# Patient Record
Sex: FEMALE | URBAN - METROPOLITAN AREA
[De-identification: names, ages, dates, MRNs, and addresses within clinical notes are randomized per-mention and may not be internally consistent; named-entity substitution may affect disease eponyms.]

---
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
## 2023-08-04 DIAGNOSIS — Z00.00 HEALTHCARE MAINTENANCE: ICD-10-CM

## 2023-08-09 ENCOUNTER — LAB (OUTPATIENT)
Dept: LAB | Facility: LAB | Age: 23
End: 2023-08-09
Payer: COMMERCIAL

## 2023-08-09 DIAGNOSIS — Z00.00 HEALTHCARE MAINTENANCE: ICD-10-CM

## 2023-08-09 LAB
ALANINE AMINOTRANSFERASE (SGPT) (U/L) IN SER/PLAS: 10 U/L (ref 7–45)
ALBUMIN (G/DL) IN SER/PLAS: 4.8 G/DL (ref 3.4–5)
ALKALINE PHOSPHATASE (U/L) IN SER/PLAS: 47 U/L (ref 33–110)
ANION GAP IN SER/PLAS: 10 MMOL/L (ref 10–20)
ASPARTATE AMINOTRANSFERASE (SGOT) (U/L) IN SER/PLAS: 11 U/L (ref 9–39)
BASOPHILS (10*3/UL) IN BLOOD BY AUTOMATED COUNT: 0.04 X10E9/L (ref 0–0.1)
BASOPHILS/100 LEUKOCYTES IN BLOOD BY AUTOMATED COUNT: 0.9 % (ref 0–2)
BILIRUBIN TOTAL (MG/DL) IN SER/PLAS: 0.4 MG/DL (ref 0–1.2)
CALCIUM (MG/DL) IN SER/PLAS: 9.3 MG/DL (ref 8.6–10.3)
CARBON DIOXIDE, TOTAL (MMOL/L) IN SER/PLAS: 27 MMOL/L (ref 21–32)
CHLORIDE (MMOL/L) IN SER/PLAS: 106 MMOL/L (ref 98–107)
CHOLESTEROL (MG/DL) IN SER/PLAS: 123 MG/DL (ref 0–199)
CHOLESTEROL IN HDL (MG/DL) IN SER/PLAS: 53.6 MG/DL
CHOLESTEROL/HDL RATIO: 2.3
CLUE CELLS: NORMAL
CREATININE (MG/DL) IN SER/PLAS: 0.72 MG/DL (ref 0.5–1.05)
EOSINOPHILS (10*3/UL) IN BLOOD BY AUTOMATED COUNT: 0.34 X10E9/L (ref 0–0.7)
EOSINOPHILS/100 LEUKOCYTES IN BLOOD BY AUTOMATED COUNT: 7.8 % (ref 0–6)
ERYTHROCYTE DISTRIBUTION WIDTH (RATIO) BY AUTOMATED COUNT: 12.6 % (ref 11.5–14.5)
ERYTHROCYTE MEAN CORPUSCULAR HEMOGLOBIN CONCENTRATION (G/DL) BY AUTOMATED: 33 G/DL (ref 32–36)
ERYTHROCYTE MEAN CORPUSCULAR VOLUME (FL) BY AUTOMATED COUNT: 89 FL (ref 80–100)
ERYTHROCYTES (10*6/UL) IN BLOOD BY AUTOMATED COUNT: 5.09 X10E12/L (ref 4–5.2)
ESTIMATED AVERAGE GLUCOSE FOR HBA1C: 105 MG/DL
GFR FEMALE: >90 ML/MIN/1.73M2
GLUCOSE (MG/DL) IN SER/PLAS: 89 MG/DL (ref 74–99)
HEMATOCRIT (%) IN BLOOD BY AUTOMATED COUNT: 45.2 % (ref 36–46)
HEMOGLOBIN (G/DL) IN BLOOD: 14.9 G/DL (ref 12–16)
HEMOGLOBIN A1C/HEMOGLOBIN TOTAL IN BLOOD: 5.3 %
IMMATURE GRANULOCYTES/100 LEUKOCYTES IN BLOOD BY AUTOMATED COUNT: 0 % (ref 0–0.9)
LDL: 61 MG/DL (ref 0–119)
LEUKOCYTES (10*3/UL) IN BLOOD BY AUTOMATED COUNT: 4.4 X10E9/L (ref 4.4–11.3)
LYMPHOCYTES (10*3/UL) IN BLOOD BY AUTOMATED COUNT: 1.83 X10E9/L (ref 1.2–4.8)
LYMPHOCYTES/100 LEUKOCYTES IN BLOOD BY AUTOMATED COUNT: 41.9 % (ref 13–44)
MONOCYTES (10*3/UL) IN BLOOD BY AUTOMATED COUNT: 0.36 X10E9/L (ref 0.1–1)
MONOCYTES/100 LEUKOCYTES IN BLOOD BY AUTOMATED COUNT: 8.2 % (ref 2–10)
NEUTROPHILS (10*3/UL) IN BLOOD BY AUTOMATED COUNT: 1.8 X10E9/L (ref 1.2–7.7)
NEUTROPHILS/100 LEUKOCYTES IN BLOOD BY AUTOMATED COUNT: 41.2 % (ref 40–80)
NON HDL CHOLESTEROL: 69 MG/DL (ref 0–149)
NUGENT SCORE: 1
PLATELETS (10*3/UL) IN BLOOD AUTOMATED COUNT: 251 X10E9/L (ref 150–450)
POTASSIUM (MMOL/L) IN SER/PLAS: 4.6 MMOL/L (ref 3.5–5.3)
PROTEIN TOTAL: 6.7 G/DL (ref 6.4–8.2)
SODIUM (MMOL/L) IN SER/PLAS: 138 MMOL/L (ref 136–145)
THYROTROPIN (MIU/L) IN SER/PLAS BY DETECTION LIMIT <= 0.05 MIU/L: 1.36 MIU/L (ref 0.44–3.98)
TRIGLYCERIDE (MG/DL) IN SER/PLAS: 43 MG/DL (ref 0–149)
UREA NITROGEN (MG/DL) IN SER/PLAS: 13 MG/DL (ref 6–23)
VLDL: 9 MG/DL (ref 0–40)
YEAST: NORMAL

## 2023-08-09 PROCEDURE — 85025 COMPLETE CBC W/AUTO DIFF WBC: CPT

## 2023-08-09 PROCEDURE — 84443 ASSAY THYROID STIM HORMONE: CPT

## 2023-08-09 PROCEDURE — 36415 COLL VENOUS BLD VENIPUNCTURE: CPT

## 2023-08-09 PROCEDURE — 80061 LIPID PANEL: CPT

## 2023-08-09 PROCEDURE — 80053 COMPREHEN METABOLIC PANEL: CPT

## 2023-08-09 PROCEDURE — 83036 HEMOGLOBIN GLYCOSYLATED A1C: CPT

## 2023-08-10 NOTE — RESULT ENCOUNTER NOTE
Hemoglobin A1c well within normal limits at 5.3%    Thyroid within normal limits    Cholesterol looks fantastic at 123, HDL 53, LDL 61, triglycerides 43    Sugar, kidneys, liver, electrodes are all within normal limits    Complete blood cell count shows no anemia    This is fantastic lab work    Will discuss this further at your health maintenance examination on 8/16/2023

## 2023-08-14 ASSESSMENT — PROMIS GLOBAL HEALTH SCALE
RATE_PHYSICAL_HEALTH: GOOD
RATE_AVERAGE_FATIGUE: MILD
RATE_MENTAL_HEALTH: FAIR
EMOTIONAL_PROBLEMS: SOMETIMES
RATE_QUALITY_OF_LIFE: GOOD
RATE_SOCIAL_SATISFACTION: FAIR
RATE_AVERAGE_PAIN: 4
CARRYOUT_PHYSICAL_ACTIVITIES: COMPLETELY
RATE_GENERAL_HEALTH: VERY GOOD
CARRYOUT_SOCIAL_ACTIVITIES: GOOD

## 2023-08-16 ENCOUNTER — OFFICE VISIT (OUTPATIENT)
Dept: PRIMARY CARE | Facility: CLINIC | Age: 23
End: 2023-08-16
Payer: COMMERCIAL

## 2023-08-16 ENCOUNTER — LAB (OUTPATIENT)
Dept: LAB | Facility: LAB | Age: 23
End: 2023-08-16
Payer: COMMERCIAL

## 2023-08-16 VITALS
HEART RATE: 58 BPM | BODY MASS INDEX: 20.17 KG/M2 | DIASTOLIC BLOOD PRESSURE: 62 MMHG | SYSTOLIC BLOOD PRESSURE: 100 MMHG | HEIGHT: 69 IN | WEIGHT: 136.2 LBS

## 2023-08-16 DIAGNOSIS — E53.8 B12 DEFICIENCY: ICD-10-CM

## 2023-08-16 DIAGNOSIS — E55.9 VITAMIN D DEFICIENCY: ICD-10-CM

## 2023-08-16 DIAGNOSIS — Z00.00 HEALTHCARE MAINTENANCE: ICD-10-CM

## 2023-08-16 DIAGNOSIS — Z00.00 HEALTHCARE MAINTENANCE: Primary | ICD-10-CM

## 2023-08-16 DIAGNOSIS — F32.A DEPRESSION, UNSPECIFIED DEPRESSION TYPE: ICD-10-CM

## 2023-08-16 PROBLEM — N92.6 IRREGULAR MENSES: Status: ACTIVE | Noted: 2023-08-16

## 2023-08-16 LAB
POC APPEARANCE, URINE: CLEAR
POC BILIRUBIN, URINE: NEGATIVE
POC BLOOD, URINE: NEGATIVE
POC COLOR, URINE: YELLOW
POC GLUCOSE, URINE: NEGATIVE MG/DL
POC KETONES, URINE: NEGATIVE MG/DL
POC LEUKOCYTES, URINE: NEGATIVE
POC NITRITE,URINE: NEGATIVE
POC PH, URINE: 7.5 PH
POC PROTEIN, URINE: NEGATIVE MG/DL
POC SPECIFIC GRAVITY, URINE: <=1.005
POC UROBILINOGEN, URINE: 0.2 EU/DL

## 2023-08-16 PROCEDURE — 36415 COLL VENOUS BLD VENIPUNCTURE: CPT

## 2023-08-16 PROCEDURE — 99213 OFFICE O/P EST LOW 20 MIN: CPT | Performed by: STUDENT IN AN ORGANIZED HEALTH CARE EDUCATION/TRAINING PROGRAM

## 2023-08-16 PROCEDURE — 1036F TOBACCO NON-USER: CPT | Performed by: STUDENT IN AN ORGANIZED HEALTH CARE EDUCATION/TRAINING PROGRAM

## 2023-08-16 PROCEDURE — 99395 PREV VISIT EST AGE 18-39: CPT | Performed by: STUDENT IN AN ORGANIZED HEALTH CARE EDUCATION/TRAINING PROGRAM

## 2023-08-16 PROCEDURE — 82306 VITAMIN D 25 HYDROXY: CPT

## 2023-08-16 PROCEDURE — 82607 VITAMIN B-12: CPT

## 2023-08-16 PROCEDURE — 81002 URINALYSIS NONAUTO W/O SCOPE: CPT | Performed by: STUDENT IN AN ORGANIZED HEALTH CARE EDUCATION/TRAINING PROGRAM

## 2023-08-16 RX ORDER — FLUOXETINE HYDROCHLORIDE 20 MG/1
1 CAPSULE ORAL DAILY
COMMUNITY
Start: 2021-03-10 | End: 2023-08-16 | Stop reason: SDUPTHER

## 2023-08-16 RX ORDER — FLUOXETINE HYDROCHLORIDE 40 MG/1
40 CAPSULE ORAL DAILY
Qty: 90 CAPSULE | Refills: 1 | Status: SHIPPED | OUTPATIENT
Start: 2023-08-16

## 2023-08-16 NOTE — PROGRESS NOTES
Subjective   Patient ID: Karie Hardy is a 23 y.o. female who presents for Annual Exam.  Today she is accompanied by alone.     HPI  1. Health Maintenance Examination  Overall patient is doing well.   Immunization: UPD on all immunizations  Last Tdap 11/5/2020  COVID-19 vaccine Pfizer x1, Moderna x1  OB/GYN: Sees Chelle German, last visit on (8/8/2023).   Diet: Well balanced  Exercise: Active  Tobacco: Denies use  EtOH: Rarely Socially    Most recent HbA1c, CBC, CMP, Lipid Panel (8/9/2023) all within normal limits     2. Fatigue/vitamin D deficiency/vitamin B12 deficiency  Recent TSH on 8/9/2023 was 1.36.   Denies any nausea, vomiting, myalgia or constipation.  Has a history of vitamin D and B12 deficiency  Unfortunate was not added on her lab work and requesting for to be done     3.  Depression  Currently on fluoxetine 40 mg daily  Stated that she is working with her therapist who recommended that we increase her 20 mg to 40  Doing much better with this medication  Denies any suicidal ideation  Requesting refills      Current Outpatient Medications on File Prior to Visit   Medication Sig Dispense Refill    [DISCONTINUED] FLUoxetine (PROzac) 20 mg capsule Take 1 capsule (20 mg) by mouth once daily.       No current facility-administered medications on file prior to visit.        Allergies   Allergen Reactions    Aripiprazole Anxiety, Other, Itching and Shortness of breath     PANIC ATTACK    Quetiapine Anaphylaxis, Anxiety and Other    Hydrocodone-Acetaminophen Other and Nausea And Vomiting    Cocoa Diarrhea and Other       Immunization History   Administered Date(s) Administered    Flu vaccine (IIV4), preservative free *Check age/dose* 10/08/2020, 10/06/2021    Moderna SARS-CoV-2 Vaccination 05/04/2021    Pfizer Purple Cap SARS-CoV-2 12/19/2021    Tdap vaccine, age 10 years and older (BOOSTRIX) 10/08/2020         Review of Systems  All pertinent positive symptoms are included in the history of present  "illness.  All other systems have been reviewed and are negative and noncontributory to this patient's current ailments.     Objective   /62 (BP Location: Left arm, Patient Position: Sitting, BP Cuff Size: Adult)   Pulse 58   Ht 1.753 m (5' 9\")   Wt 61.8 kg (136 lb 3.2 oz)   BMI 20.11 kg/m²   BSA: 1.73 meters squared  Office Visit on 08/16/2023   Component Date Value Ref Range Status    POC Color, Urine 08/16/2023 Yellow  Straw, Yellow, Light Yellow Final    POC Appearance, Urine 08/16/2023 Clear  Clear Final    POC Specific Gravity, Urine 08/16/2023 <=1.005  1.005 - 1.035 Final    POC PH, Urine 08/16/2023 7.5  No Reference Range Established PH Final    POC Protein, Urine 08/16/2023 NEGATIVE  NEGATIVE, 30 (1+) mg/dl Final    POC Glucose, Urine 08/16/2023 NEGATIVE  NEGATIVE mg/dl Final    POC Blood, Urine 08/16/2023 NEGATIVE  NEGATIVE Final    POC Ketones, Urine 08/16/2023 NEGATIVE  NEGATIVE mg/dl Final    POC Bilirubin, Urine 08/16/2023 NEGATIVE  NEGATIVE Final    POC Urobilinogen, Urine 08/16/2023 0.2  0.2, 1.0 EU/DL Final    Poc Nitrate, Urine 08/16/2023 NEGATIVE  NEGATIVE Final    POC Leukocytes, Urine 08/16/2023 NEGATIVE  NEGATIVE Final   Lab on 08/09/2023   Component Date Value Ref Range Status    TSH 08/09/2023 1.36  0.44 - 3.98 mIU/L Final     TSH testing is performed using different testing    methodology at [facility] than at other    [facility]. Direct result comparisons should    only be made within the same method.    Cholesterol 08/09/2023 123  0 - 199 mg/dL Final    .      AGE      DESIRABLE   BORDERLINE HIGH   HIGH     0-19 Y     0 - 169       170 - 199     >/= 200    20-24 Y     0 - 189       190 - 224     >/= 225         >24 Y     0 - 199       200 - 239     >/= 240   **All ranges are based on fasting samples. Specific   therapeutic targets will vary based on patient-specific   cardiac risk.  .   Pediatric guidelines reference:Pediatrics 2011, 128(S5).   Adult guidelines " reference: NCEP ATPIII Guidelines,     LUZ 2001, 258:2486-97  .   Venipuncture immediately after or during the    administration of Metamizole may lead to falsely   low results. Testing should be performed immediately   prior to Metamizole dosing.    HDL 08/09/2023 53.6  mg/dL Final    .      AGE      VERY LOW   LOW     NORMAL    HIGH       0-19 Y       < 35   < 40     40-45     ----    20-24 Y       ----   < 40       >45     ----      >24 Y       ----   < 40     40-60      >60  .    Cholesterol/HDL Ratio 08/09/2023 2.3   Final    REF VALUES  DESIRABLE  < 3.4  HIGH RISK  > 5.0    LDL 08/09/2023 61  0 - 119 mg/dL Final    .                           NEAR      BORD      AGE      DESIRABLE  OPTIMAL    HIGH     HIGH     VERY HIGH     0-19 Y     0 - 109     ---    110-129   >/= 130     ----    20-24 Y     0 - 119     ---    120-159   >/= 160     ----      >24 Y     0 -  99   100-129  130-159   160-189     >/=190  .    VLDL 08/09/2023 9  0 - 40 mg/dL Final    Triglycerides 08/09/2023 43  0 - 149 mg/dL Final    .      AGE      DESIRABLE   BORDERLINE HIGH   HIGH     VERY HIGH   0 D-90 D    19 - 174         ----         ----        ----  91 D- 9 Y     0 -  74        75 -  99     >/= 100      ----    10-19 Y     0 -  89        90 - 129     >/= 130      ----    20-24 Y     0 - 114       115 - 149     >/= 150      ----         >24 Y     0 - 149       150 - 199    200- 499    >/= 500  .   Venipuncture immediately after or during the    administration of Metamizole may lead to falsely   low results. Testing should be performed immediately   prior to Metamizole dosing.    Non HDL Cholesterol 08/09/2023 69  0 - 149 mg/dL Final        AGE      DESIRABLE   BORDERLINE HIGH   HIGH     VERY HIGH     0-19 Y     0 - 119       120 - 144     >/= 145    >/= 160    20-24 Y     0 - 149       150 - 189     >/= 190      ----         >24 Y    30 MG/DL ABOVE LDL CHOLESTEROL GOAL  .    Glucose 08/09/2023 89  74 - 99 mg/dL Final    Sodium  08/09/2023 138  136 - 145 mmol/L Final    Potassium 08/09/2023 4.6  3.5 - 5.3 mmol/L Final    Chloride 08/09/2023 106  98 - 107 mmol/L Final    Bicarbonate 08/09/2023 27  21 - 32 mmol/L Final    Anion Gap 08/09/2023 10  10 - 20 mmol/L Final    Urea Nitrogen 08/09/2023 13  6 - 23 mg/dL Final    Creatinine 08/09/2023 0.72  0.50 - 1.05 mg/dL Final    GFR Female 08/09/2023 >90  >90 mL/min/1.73m2 Final     CALCULATIONS OF ESTIMATED GFR ARE PERFORMED   USING THE 2021 CKD-EPI STUDY REFIT EQUATION   WITHOUT THE RACE VARIABLE FOR THE IDMS-TRACEABLE   CREATININE METHODS.    https://jasn.asnjournals.org/content/early/2021/09/22/ASN.2002124566    Calcium 08/09/2023 9.3  8.6 - 10.3 mg/dL Final    Albumin 08/09/2023 4.8  3.4 - 5.0 g/dL Final    Alkaline Phosphatase 08/09/2023 47  33 - 110 U/L Final    Total Protein 08/09/2023 6.7  6.4 - 8.2 g/dL Final    AST 08/09/2023 11  9 - 39 U/L Final    Total Bilirubin 08/09/2023 0.4  0.0 - 1.2 mg/dL Final    ALT (SGPT) 08/09/2023 10  7 - 45 U/L Final     Patients treated with Sulfasalazine may generate    falsely decreased results for ALT.    WBC 08/09/2023 4.4  4.4 - 11.3 x10E9/L Final    RBC 08/09/2023 5.09  4.00 - 5.20 x10E12/L Final    Hemoglobin 08/09/2023 14.9  12.0 - 16.0 g/dL Final    Hematocrit 08/09/2023 45.2  36.0 - 46.0 % Final    MCV 08/09/2023 89  80 - 100 fL Final    MCHC 08/09/2023 33.0  32.0 - 36.0 g/dL Final    Platelets 08/09/2023 251  150 - 450 x10E9/L Final    RDW 08/09/2023 12.6  11.5 - 14.5 % Final    Neutrophils % 08/09/2023 41.2  40.0 - 80.0 % Final    Immature Granulocytes %, Automated 08/09/2023 0.0  0.0 - 0.9 % Final     Immature Granulocyte Count (IG) includes promyelocytes,    myelocytes and metamyelocytes but does not include bands.   Percent differential counts (%) should be interpreted in the   context of the absolute cell counts (cells/L).    Lymphocytes % 08/09/2023 41.9  13.0 - 44.0 % Final    Monocytes % 08/09/2023 8.2  2.0 - 10.0 % Final    Eosinophils  % 08/09/2023 7.8  0.0 - 6.0 % Final    Basophils % 08/09/2023 0.9  0.0 - 2.0 % Final    Neutrophils Absolute 08/09/2023 1.80  1.20 - 7.70 x10E9/L Final    Lymphocytes Absolute 08/09/2023 1.83  1.20 - 4.80 x10E9/L Final    Monocytes Absolute 08/09/2023 0.36  0.10 - 1.00 x10E9/L Final    Eosinophils Absolute 08/09/2023 0.34  0.00 - 0.70 x10E9/L Final    Basophils Absolute 08/09/2023 0.04  0.00 - 0.10 x10E9/L Final    Hemoglobin A1C 08/09/2023 5.3  % Final         Diagnosis of Diabetes-Adults   Non-Diabetic: < or = 5.6%   Increased risk for developing diabetes: 5.7-6.4%   Diagnostic of diabetes: > or = 6.5%  .       Monitoring of Diabetes                Age (y)     Therapeutic Goal (%)   Adults:          >18           <7.0   Pediatrics:    13-18           <7.5                   7-12           <8.0                   0- 6            7.5-8.5   American Diabetes Association. Diabetes Care 33(S1), Jan 2010.    Estimated Average Glucose 08/09/2023 105  MG/DL Final   Orders Only on 08/09/2023   Component Date Value Ref Range Status    Jayme Score 08/08/2023 1   Final    Comment: Interpretation of the Jayme Score  0-3.....Normal vaginal microbiota  4-6.....Intermediate results  7-10....Bacterial vaginosis      Yeast 08/08/2023 ABSENT   Final    Clue Cells 08/08/2023 ABSENT   Final       Physical Exam  CONSTITUTIONAL - well nourished, well developed, looks like stated age, in no acute distress, not ill-appearing, and not tired appearing  SKIN - normal skin color and pigmentation, normal skin turgor without rash, lesions, or nodules visualized  HEAD - no trauma, normocephalic  EYES - pupils are equal and reactive to light, extraocular muscles are intact, and normal external exam  ENT - TM's intact, no injection, no signs of infection, uvula midline, normal tongue movement and throat normal, no exudate  NECK - supple without rigidity, no neck mass was observed  CHEST - clear to auscultation, no wheezing, no crackles and no  rales, good effort  CARDIAC - regular rate and regular rhythm, no skipped beats, no murmur  ABDOMEN - no organomegaly, soft, nontender, nondistended, normal bowel sounds, no guarding/rebound/rigidity  EXTREMITIES - no edema, no deformities  NEUROLOGICAL - normal gait, normal balance, normal motor, no ataxia, alert, oriented and no focal signs  PSYCHIATRIC - alert, pleasant and cordial, age-appropriate  IMMUNOLOGIC - no cervical lymphadenopathy     Assessment/Plan   1. Health Maintenance Examination  Complete history and physical examination was performed  We discussed all of your lab work in great detail  Continue eating a well-balanced diet and exercising regularly  Please follow-up with your OB/GYN for her well woman needs    2. Fatigue/vitamin B12/D deficiency  Recent TSH on 8/9/2023 was 1.36.   I have ordered B12 and vitamin D to be done after today's appointment  We will notify the results and make recommendations accordingly     3.  Depression  Continue fluoxetine at 40 mg daily  I increased your medication to match what you are taking  Please notify the office if you have any worsening or acute signs/symptoms    Please follow-up in 6 months for continued care and refills

## 2023-08-17 LAB
CALCIDIOL (25 OH VITAMIN D3) (NG/ML) IN SER/PLAS: 30 NG/ML
COBALAMIN (VITAMIN B12) (PG/ML) IN SER/PLAS: 386 PG/ML (ref 211–911)

## 2023-08-17 NOTE — RESULT ENCOUNTER NOTE
Vitamin D within normal limits at 30 but this is the cutoff of normal so I would recommend continuing supplementation    B12 well within normal limits at 386

## 2024-01-15 ENCOUNTER — OFFICE VISIT (OUTPATIENT)
Dept: OBSTETRICS AND GYNECOLOGY | Facility: CLINIC | Age: 24
End: 2024-01-15
Payer: COMMERCIAL

## 2024-01-15 VITALS
BODY MASS INDEX: 19.4 KG/M2 | SYSTOLIC BLOOD PRESSURE: 100 MMHG | HEIGHT: 69 IN | WEIGHT: 131 LBS | DIASTOLIC BLOOD PRESSURE: 66 MMHG

## 2024-01-15 DIAGNOSIS — Z01.419 WOMEN'S ANNUAL ROUTINE GYNECOLOGICAL EXAMINATION: Primary | ICD-10-CM

## 2024-01-15 DIAGNOSIS — Z12.4 PAP SMEAR FOR CERVICAL CANCER SCREENING: ICD-10-CM

## 2024-01-15 PROCEDURE — 1036F TOBACCO NON-USER: CPT | Performed by: NURSE PRACTITIONER

## 2024-01-15 PROCEDURE — 88175 CYTOPATH C/V AUTO FLUID REDO: CPT

## 2024-01-15 PROCEDURE — 99395 PREV VISIT EST AGE 18-39: CPT | Performed by: NURSE PRACTITIONER

## 2024-01-15 ASSESSMENT — ENCOUNTER SYMPTOMS
GASTROINTESTINAL NEGATIVE: 1
NEUROLOGICAL NEGATIVE: 1
RESPIRATORY NEGATIVE: 1
EYES NEGATIVE: 1
ENDOCRINE NEGATIVE: 1
CARDIOVASCULAR NEGATIVE: 1
MUSCULOSKELETAL NEGATIVE: 1
PSYCHIATRIC NEGATIVE: 1
CONSTITUTIONAL NEGATIVE: 1

## 2024-01-15 NOTE — PROGRESS NOTES
Subjective   Patient ID: Karie Hardy is a 23 y.o. female who presents for Annual Exam (Last Annual with Pushpa German 1/11/2023/Normal PAP 10/27/2021. /Depo was stopped 1/11/23).  23 year old here for annual exam without complaints.  She stopped the depo in march 2023.  Since then she has had random bleeding, but no regulated menses.  She first had just dark blood and now it started to turn more bright red.  She notes that she is bleeding for 2-3 days and it will be every few weeks.  She is not sexually active at this time.  She denies any need for std testing.  She is due for her pap.          Review of Systems   Constitutional: Negative.    HENT: Negative.     Eyes: Negative.    Respiratory: Negative.     Cardiovascular: Negative.    Gastrointestinal: Negative.    Endocrine: Negative.    Genitourinary: Negative.    Musculoskeletal: Negative.    Skin: Negative.    Neurological: Negative.    Psychiatric/Behavioral: Negative.         Objective   Physical Exam  Vitals reviewed.   Constitutional:       Appearance: Normal appearance. She is well-developed.   Pulmonary:      Effort: Pulmonary effort is normal. No respiratory distress.   Chest:   Breasts:     Breasts are symmetrical.      Right: Normal. No swelling, bleeding, inverted nipple, mass, nipple discharge, skin change or tenderness.      Left: Normal. No swelling, bleeding, inverted nipple, mass, nipple discharge, skin change or tenderness.   Abdominal:      Palpations: Abdomen is soft.   Genitourinary:     General: Normal vulva.      Exam position: Lithotomy position.      Pubic Area: No rash.       Labia:         Right: No rash, tenderness, lesion or injury.         Left: No rash, tenderness, lesion or injury.       Urethra: No prolapse, urethral pain, urethral swelling or urethral lesion.      Vagina: Normal.      Cervix: Normal.      Uterus: Normal.       Adnexa: Right adnexa normal and left adnexa normal.      Rectum: Normal.   Musculoskeletal:          General: Normal range of motion.   Lymphadenopathy:      Upper Body:      Right upper body: No supraclavicular, axillary or pectoral adenopathy.      Left upper body: No supraclavicular, axillary or pectoral adenopathy.   Skin:     General: Skin is warm and dry.   Neurological:      General: No focal deficit present.      Mental Status: She is alert and oriented to person, place, and time. Mental status is at baseline.   Psychiatric:         Attention and Perception: Attention and perception normal.         Mood and Affect: Mood and affect normal.         Speech: Speech normal.         Behavior: Behavior normal. Behavior is cooperative.         Thought Content: Thought content normal.         Judgment: Judgment normal.         Assessment/Plan   Problem List Items Addressed This Visit             ICD-10-CM    Women's annual routine gynecological examination - Primary Z01.419     Other Visit Diagnoses         Codes    Pap smear for cervical cancer screening     Z12.4        Pap/hpv sent  Declined std testing  Reviewed can take up to 12-18 months for menses to regulate post depo, patient desires to continue to monitor and will call if she wants birth control regulate, declines at this time  Follow up 1 year or sooner if needed         CALLIE Valdez-CNP 01/15/24 10:11 AM

## 2024-01-30 LAB
CYTOLOGY CMNT CVX/VAG CYTO-IMP: NORMAL
LAB AP HPV GENOTYPE QUESTION: NO
LAB AP HPV HR: NORMAL
LABORATORY COMMENT REPORT: NORMAL
PATH REPORT.TOTAL CANCER: NORMAL

## 2024-12-11 ENCOUNTER — TELEMEDICINE (OUTPATIENT)
Dept: PRIMARY CARE | Facility: CLINIC | Age: 24
End: 2024-12-11
Payer: COMMERCIAL

## 2024-12-11 DIAGNOSIS — J06.9 ACUTE URI: Primary | ICD-10-CM

## 2024-12-11 DIAGNOSIS — F32.A DEPRESSION, UNSPECIFIED DEPRESSION TYPE: ICD-10-CM

## 2024-12-11 PROCEDURE — 1036F TOBACCO NON-USER: CPT | Performed by: STUDENT IN AN ORGANIZED HEALTH CARE EDUCATION/TRAINING PROGRAM

## 2024-12-11 PROCEDURE — 99214 OFFICE O/P EST MOD 30 MIN: CPT | Performed by: STUDENT IN AN ORGANIZED HEALTH CARE EDUCATION/TRAINING PROGRAM

## 2024-12-11 RX ORDER — AZITHROMYCIN 250 MG/1
TABLET, FILM COATED ORAL
Qty: 6 TABLET | Refills: 0 | Status: SHIPPED | OUTPATIENT
Start: 2024-12-11 | End: 2024-12-16

## 2024-12-11 RX ORDER — FLUOXETINE HYDROCHLORIDE 40 MG/1
40 CAPSULE ORAL DAILY
Qty: 90 CAPSULE | Refills: 1 | Status: SHIPPED | OUTPATIENT
Start: 2024-12-11

## 2024-12-11 NOTE — PROGRESS NOTES
Subjective   Patient ID: Karie Hardy is a 24 y.o. female who presents for Sick Visit.  Today she is accompanied by alone.     HPI  1. 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 Depression   Currently taking Prozac 40 mg daily   However recently has not filled her prescription due to grad school and insurance problems     Current Outpatient Medications on File Prior to Visit   Medication Sig Dispense Refill    [DISCONTINUED] FLUoxetine (PROzac) 40 mg capsule Take 1 capsule (40 mg) by mouth once daily. 90 capsule 1     No current facility-administered medications on file prior to visit.        Allergies   Allergen Reactions    Aripiprazole Anxiety, Other, Itching and Shortness of breath     PANIC ATTACK    Quetiapine Anaphylaxis, Anxiety and Other    Hydrocodone-Acetaminophen Other and Nausea And Vomiting    Cocoa Diarrhea and Other       Immunization History   Administered Date(s) Administered    COVID-19, mRNA, LNP-S, PF, 30 mcg/0.3 mL dose 12/19/2021    Flu vaccine (IIV4), preservative free *Check age/dose* 10/08/2020, 10/06/2021    Moderna SARS-CoV-2 Vaccination 05/04/2021    Tdap vaccine, age 7 year and older (BOOSTRIX, ADACEL) 10/08/2020         Review of Systems  All pertinent positive symptoms are included in the history of present illness.  All other systems have been reviewed and are negative and noncontributory to this patient's current ailments.     Objective   There were no vitals taken for this visit.  BSA: There is no height or weight on file to calculate BSA.  No visits with results within 1 Month(s) from  this visit.   Latest known visit with results is:   Office Visit on 01/15/2024   Component Date Value Ref Range Status    Case Report 01/15/2024    Final                    Value:Gynecologic Cytology                              Case: [id number]                                   Authorizing Provider:  EUSEBIA Valdez Collected:           01/15/2024 1050              Ordering Location:     [facility]      Received:            01/15/2024 1050                                     Center                                                                       First Screen:          PILI Mar                                                         Rescreen:              PILI Anderson                                                          Specimen:    ThinPrep Liquid-Based Pap-Imaging System Screen, CERVIX, SCREENING                         Final Cytological Interpretation 01/15/2024    Final                    Value:                                                    A. THINPREP PAP CERVIX, SCREENING -                                                     Specimen Adequacy                          Satisfactory for evaluation; endocervical/transformation zone component is                           present                                                    General Categorization                          Negative for intraepithelial lesion or malignancy.                                                    Descriptive Interpretation                          Negative for intraepithelial lesion or malignancy                                                                                    01/15/2024    Final                    Value:Slide(s) initially screened by PILI Mar at [facility]                           [id number] [facility] [id number]                          QC review performed by PILI Anderson at [facility]                            [address] [facility] [id number]                          By the signature on this report, the individual or group listed as making                           the Final Interpretation/Diagnosis certifies that they have reviewed this                           case.     ThinPrep Imaging System 01/15/2024    Final                    Value:This specimen has been analyzed by the ThinPrep Imaging System ([employer] an automated imaging and review system, which assists the                           laboratory in evaluating cells on ThinPrep Pap tests. Following automated                           imaging, selected fields from every slide were reviewed by a                           cytotechnologist and/or pathologist.                              Educational Note 01/15/2024    Final                    Value:Cervical cytology is a screening procedure primarily for squamous cancers                           and precursors and has associated false-negative and false-positives                           results as evidenced by published data. Your patient's test should be                           interpreted in this context, together with the patient's history and                           clinical findings. Regular sampling and follow-up of unexplained clinical                           signs and symptoms are recommended to minimize false negative results.    Perform HPV HR test? 01/15/2024 Never   Final    Include HPV Genotype? 01/15/2024 No   Final       Physical Exam  CONSTITUTIONAL - well nourished, well developed, looks like stated age, in no acute distress, not ill-appearing, and not tired appearing  SKIN - normal skin color and pigmentation, normal skin turgor without rash, lesions, or nodules visualized  HEAD - no trauma, normocephalic  EYES - normal external exam  CHEST -no distressed breathing, good effort  EXTREMITIES - no edema, no  deformities  NEUROLOGICAL - normal balance, normal motor, no ataxia  PSYCHIATRIC - alert, pleasant and cordial, age-appropriate    Assessment/Plan   1. Acute URI   I would recommend getting an at home COVID and flu test from your local pharmacy   If this comes back positive, please send our office a message and we can treat accordingly   Recommend Z-razia.  Take 2 pills for the first day and then 1 pill for the following 4 days if you are not getting better     Please contact the office if you are not getting better we will discuss this further    2. Depression   Continue taking Prozac 40 mg daily   Refill sent to your local pharmacy     Follow up in 6 months for continued care

## 2025-01-20 ENCOUNTER — APPOINTMENT (OUTPATIENT)
Dept: OBSTETRICS AND GYNECOLOGY | Facility: CLINIC | Age: 25
End: 2025-01-20
Payer: COMMERCIAL

## 2025-01-20 VITALS
HEIGHT: 69 IN | BODY MASS INDEX: 20.29 KG/M2 | DIASTOLIC BLOOD PRESSURE: 72 MMHG | SYSTOLIC BLOOD PRESSURE: 112 MMHG | WEIGHT: 137 LBS

## 2025-01-20 DIAGNOSIS — Z01.419 WOMEN'S ANNUAL ROUTINE GYNECOLOGICAL EXAMINATION: Primary | ICD-10-CM

## 2025-01-20 PROCEDURE — 99395 PREV VISIT EST AGE 18-39: CPT | Performed by: NURSE PRACTITIONER

## 2025-01-20 PROCEDURE — 1036F TOBACCO NON-USER: CPT | Performed by: NURSE PRACTITIONER

## 2025-01-20 PROCEDURE — 3008F BODY MASS INDEX DOCD: CPT | Performed by: NURSE PRACTITIONER

## 2025-01-20 ASSESSMENT — ENCOUNTER SYMPTOMS
CARDIOVASCULAR NEGATIVE: 1
PSYCHIATRIC NEGATIVE: 1
EYES NEGATIVE: 1
GASTROINTESTINAL NEGATIVE: 1
NEUROLOGICAL NEGATIVE: 1
RESPIRATORY NEGATIVE: 1
ENDOCRINE NEGATIVE: 1
MUSCULOSKELETAL NEGATIVE: 1
CONSTITUTIONAL NEGATIVE: 1

## 2025-01-20 NOTE — PROGRESS NOTES
Subjective   Patient ID: Karie Hardy is a 24 y.o. female who presents for Annual Exam (Normal PAP 1/15/2024).  24 year old here for annual exam without complaints.  She notes that her periods are starting to regulate after seeing the chiropractor.  She is not currently using birth control and desires to remain without.  She denies any need for std testing.  She denies any abnormal bleeding (for her per patient), pain, discharge, urinary changes and breast complaints.  Pap was normal in 2024.         Review of Systems   Constitutional: Negative.    HENT: Negative.     Eyes: Negative.    Respiratory: Negative.     Cardiovascular: Negative.    Gastrointestinal: Negative.    Endocrine: Negative.    Genitourinary: Negative.    Musculoskeletal: Negative.    Skin: Negative.    Neurological: Negative.    Psychiatric/Behavioral: Negative.         Objective   Physical Exam  Vitals reviewed.   Constitutional:       Appearance: Normal appearance. She is well-developed.   Pulmonary:      Effort: Pulmonary effort is normal. No respiratory distress.   Chest:   Breasts:     Breasts are symmetrical.      Right: Normal. No swelling, bleeding, inverted nipple, mass, nipple discharge, skin change or tenderness.      Left: Normal. No swelling, bleeding, inverted nipple, mass, nipple discharge, skin change or tenderness.   Abdominal:      Palpations: Abdomen is soft.   Genitourinary:     General: Normal vulva.      Exam position: Lithotomy position.      Pubic Area: No rash.       Labia:         Right: No rash, tenderness, lesion or injury.         Left: No rash, tenderness, lesion or injury.       Urethra: No prolapse, urethral pain, urethral swelling or urethral lesion.      Vagina: Normal.      Cervix: Normal.      Uterus: Normal.       Adnexa: Right adnexa normal and left adnexa normal.      Rectum: Normal.   Musculoskeletal:         General: Normal range of motion.   Lymphadenopathy:      Upper Body:      Right upper body: No  supraclavicular, axillary or pectoral adenopathy.      Left upper body: No supraclavicular, axillary or pectoral adenopathy.   Skin:     General: Skin is warm and dry.   Neurological:      General: No focal deficit present.      Mental Status: She is alert and oriented to person, place, and time. Mental status is at baseline.   Psychiatric:         Attention and Perception: Attention and perception normal.         Mood and Affect: Mood and affect normal.         Speech: Speech normal.         Behavior: Behavior normal. Behavior is cooperative.         Thought Content: Thought content normal.         Judgment: Judgment normal.         Assessment/Plan   Problem List Items Addressed This Visit             ICD-10-CM    Women's annual routine gynecological examination - Primary Z01.419     Pap due 2027  Declined std testing  Follow up 1 year for annual exam, sooner as needed if problems arise       CALLIE Valdez-CNP 01/20/25 9:30 AM

## 2026-01-26 ENCOUNTER — APPOINTMENT (OUTPATIENT)
Dept: OBSTETRICS AND GYNECOLOGY | Facility: CLINIC | Age: 26
End: 2026-01-26
Payer: COMMERCIAL